# Patient Record
Sex: FEMALE | Race: WHITE | HISPANIC OR LATINO | Employment: FULL TIME | URBAN - METROPOLITAN AREA
[De-identification: names, ages, dates, MRNs, and addresses within clinical notes are randomized per-mention and may not be internally consistent; named-entity substitution may affect disease eponyms.]

---

## 2023-10-20 ENCOUNTER — OFFICE VISIT (OUTPATIENT)
Dept: MEDICAL GROUP | Facility: MEDICAL CENTER | Age: 23
End: 2023-10-20
Payer: COMMERCIAL

## 2023-10-20 ENCOUNTER — HOSPITAL ENCOUNTER (OUTPATIENT)
Facility: MEDICAL CENTER | Age: 23
End: 2023-10-20
Attending: NURSE PRACTITIONER
Payer: COMMERCIAL

## 2023-10-20 VITALS
WEIGHT: 145.06 LBS | SYSTOLIC BLOOD PRESSURE: 116 MMHG | RESPIRATION RATE: 20 BRPM | HEART RATE: 82 BPM | BODY MASS INDEX: 24.17 KG/M2 | DIASTOLIC BLOOD PRESSURE: 78 MMHG | OXYGEN SATURATION: 98 % | HEIGHT: 65 IN | TEMPERATURE: 97 F

## 2023-10-20 DIAGNOSIS — Z00.00 PE (PHYSICAL EXAM), ANNUAL: ICD-10-CM

## 2023-10-20 DIAGNOSIS — Z23 NEED FOR VACCINATION: ICD-10-CM

## 2023-10-20 DIAGNOSIS — N30.90 CYSTITIS: ICD-10-CM

## 2023-10-20 DIAGNOSIS — Z11.59 NEED FOR HEPATITIS C SCREENING TEST: ICD-10-CM

## 2023-10-20 DIAGNOSIS — J45.20 MILD INTERMITTENT ASTHMA WITHOUT COMPLICATION: ICD-10-CM

## 2023-10-20 DIAGNOSIS — Z30.09 BIRTH CONTROL COUNSELING: ICD-10-CM

## 2023-10-20 DIAGNOSIS — R30.0 DYSURIA: ICD-10-CM

## 2023-10-20 DIAGNOSIS — Z11.4 SCREENING FOR HIV (HUMAN IMMUNODEFICIENCY VIRUS): ICD-10-CM

## 2023-10-20 PROBLEM — M72.2 PLANTAR FASCIITIS: Status: ACTIVE | Noted: 2022-04-29

## 2023-10-20 PROBLEM — M77.41 METATARSALGIA OF RIGHT FOOT: Status: ACTIVE | Noted: 2022-04-29

## 2023-10-20 LAB
APPEARANCE UR: CLEAR
BILIRUB UR STRIP-MCNC: NEGATIVE MG/DL
COLOR UR AUTO: YELLOW
GLUCOSE UR STRIP.AUTO-MCNC: NEGATIVE MG/DL
KETONES UR STRIP.AUTO-MCNC: NEGATIVE MG/DL
LEUKOCYTE ESTERASE UR QL STRIP.AUTO: NORMAL
NITRITE UR QL STRIP.AUTO: NEGATIVE
PH UR STRIP.AUTO: 7 [PH] (ref 5–8)
PROT UR QL STRIP: 30 MG/DL
RBC UR QL AUTO: NORMAL
SP GR UR STRIP.AUTO: 1.02
UROBILINOGEN UR STRIP-MCNC: NORMAL MG/DL

## 2023-10-20 PROCEDURE — 90686 IIV4 VACC NO PRSV 0.5 ML IM: CPT | Performed by: NURSE PRACTITIONER

## 2023-10-20 PROCEDURE — 90746 HEPB VACCINE 3 DOSE ADULT IM: CPT | Performed by: NURSE PRACTITIONER

## 2023-10-20 PROCEDURE — 3074F SYST BP LT 130 MM HG: CPT | Performed by: NURSE PRACTITIONER

## 2023-10-20 PROCEDURE — 90715 TDAP VACCINE 7 YRS/> IM: CPT | Performed by: NURSE PRACTITIONER

## 2023-10-20 PROCEDURE — 90472 IMMUNIZATION ADMIN EACH ADD: CPT | Performed by: NURSE PRACTITIONER

## 2023-10-20 PROCEDURE — 3078F DIAST BP <80 MM HG: CPT | Performed by: NURSE PRACTITIONER

## 2023-10-20 PROCEDURE — 81002 URINALYSIS NONAUTO W/O SCOPE: CPT | Performed by: NURSE PRACTITIONER

## 2023-10-20 PROCEDURE — 99204 OFFICE O/P NEW MOD 45 MIN: CPT | Mod: 25 | Performed by: NURSE PRACTITIONER

## 2023-10-20 PROCEDURE — 90471 IMMUNIZATION ADMIN: CPT | Performed by: NURSE PRACTITIONER

## 2023-10-20 RX ORDER — ALBUTEROL SULFATE 90 UG/1
2 AEROSOL, METERED RESPIRATORY (INHALATION) EVERY 6 HOURS PRN
Qty: 8.5 G | Refills: 1 | Status: SHIPPED | OUTPATIENT
Start: 2023-10-20

## 2023-10-20 RX ORDER — NITROFURANTOIN 25; 75 MG/1; MG/1
100 CAPSULE ORAL 2 TIMES DAILY
Qty: 10 CAPSULE | Refills: 0 | Status: SHIPPED | OUTPATIENT
Start: 2023-10-20 | End: 2023-10-25

## 2023-10-20 ASSESSMENT — PATIENT HEALTH QUESTIONNAIRE - PHQ9: CLINICAL INTERPRETATION OF PHQ2 SCORE: 0

## 2023-10-20 NOTE — ASSESSMENT & PLAN NOTE
New to me. Dx's as a child. Has not had any s/s since 15 yo. No chest tightness, cough or wheezing . Not utilizing albuterol.

## 2023-10-20 NOTE — PROGRESS NOTES
Chief Complaint   Patient presents with    Establish Care       HISTORY OF PRESENT ILLNESS: Patient is a 23 y.o. female, new patient who presents today to discuss medical problems as listed below:    Health Maintenance:  COMPLETED       Allergies: Patient has no known allergies.    Current Outpatient Medications   Medication Sig Dispense Refill    albuterol 108 (90 Base) MCG/ACT Aero Soln inhalation aerosol Inhale 2 Puffs every 6 hours as needed for Shortness of Breath. 8.5 g 1    nitrofurantoin (MACROBID) 100 MG Cap Take 1 Capsule by mouth 2 times a day for 5 days. 10 Capsule 0     No current facility-administered medications for this visit.       Allergies, past medical history, past surgical history, family history, social history reviewed and updated.    Review of Systems:     - Constitutional: Negative for fever, chills, unexpected weight change, and fatigue/generalized weakness.     - HEENT: Negative for headaches, vision changes, hearing changes, ear pain, ear discharge, rhinorrhea, sinus congestion, sore throat, and neck pain.      - Respiratory: Negative for cough, sputum production, chest congestion, dyspnea, wheezing, and crackles.      - Cardiovascular: Negative for chest pain, palpitations, orthopnea, and bilateral lower extremity edema.     - Gastrointestinal: Negative for heartburn, nausea, vomiting, abdominal pain, hematochezia, melena, diarrhea, constipation, and greasy/foul-smelling stools.     - Genitourinary: Dysuria, hematuria and urinary frequency.  Negative for , polyuria, pyuria, urinary urgency, and urinary incontinence.    - Musculoskeletal: Negative for myalgias, back pain, and joint pain.     - Skin: Negative for rash, itching, cyanotic skin color change.     - Neurological: Negative for dizziness, tingling, tremors, focal sensory deficit, focal weakness and headaches.     - Endo/Heme/Allergies: Does not bruise/bleed easily.     - Psychiatric/Behavioral: Negative for depression,  "suicidal/homicidal ideation and memory loss.      All other systems reviewed and are negative    Exam:    /78   Pulse 82   Temp 36.1 °C (97 °F) (Temporal)   Resp 20   Ht 1.64 m (5' 4.57\")   Wt 65.8 kg (145 lb 1 oz)   SpO2 98%   BMI 24.46 kg/m²  Body mass index is 24.46 kg/m².    Physical Exam:  Constitutional: Well-developed and well-nourished. Not diaphoretic. No distress.   Skin: Skin is warm and dry. No rash noted.  Head: Atraumatic without lesions.  Eyes: Conjunctivae and extraocular motions are normal. Pupils are equal, round, and reactive to light. No scleral icterus.   Ears:  External ears unremarkable. Tympanic membranes clear and intact.  Nose: Nares patent. Septum midline. Turbinates without erythema nor edema. No discharge.   Mouth/Throat: Dentition is normal. Tongue normal. Oropharynx is clear and moist. Posterior pharynx without erythema or exudates.  Neck: Supple, trachea midline. Normal range of motion. No thyromegaly present. No lymphadenopathy--cervical or supraclavicular.  Cardiovascular: Regular rate and rhythm, S1 and S2 without murmur, rubs, or gallops.    Chest: Effort normal. Clear to auscultation throughout. No adventitious sounds. No CVA tenderness.  Abdomen: Soft, non tender, and without distention. Active bowel sounds in all four quadrants. No rebound, guarding, masses or HSM.  : Dysuria, hematuria and urinary frequency.  Negative for  polyuria, pyuria, urinary urgency, and urinary incontinence.  Extremities: No cyanosis, clubbing, erythema, nor edema. Distal pulses intact and symmetric.   Musculoskeletal: All major joints AROM full in all directions without pain.  Neurological: Alert and oriented x 3. DTRs 2+/3 and symmetric. No cranial nerve deficit. 5/5 myotomes. Sensation intact. Negative Rhomberg.  Psychiatric:  Behavior, mood, and affect are appropriate.  MA/nursing note and vitals reviewed.    Assessment/Plan:  Dysuria  New to me. New problem. A wk ago started having " dysuria, urinary frequency, single episode of blood in urine not related to menses. Denies fever, flank tenderness, low back pain, nausea.   Denies pregnancy, no concerns for STI.  POCT UA with small leuks and large blood.      Mild intermittent asthma without complication  New to me. Dx's as a child. Has not had any s/s since 15 yo. No chest tightness, cough or wheezing . Not utilizing albuterol.     1. Dysuria  Discussed UTI prevention strategies.  Avoid damp or tight clothing, avoid scented detergents, recommend wearing underwear with cotton lining. Increase hydration ( at least 6-8 glasses of water per day). Consume fermented foods (ex. greek yogurt, kefir, fermented veggies.), add OTC vit C and non-sweetened cranberry juice and/or PO cranberry supplement daily.  Also recommend OTC supplement D-Mannose.  Avoid simple carbohydrates, maintain good hygiene post voidal / post BM wiping technique ( front to back). If diabetic, strive to achieve goal A1C.  If sexually active, drink 1 to 2 glasses of water prior to coitus and void right after coitus.    - Chlamydia/GC, PCR (Urine); Future  - POCT Urinalysis  - URINALYSIS,CULTURE IF INDICATED; Future  - nitrofurantoin (MACROBID) 100 MG Cap; Take 1 Capsule by mouth 2 times a day for 5 days.  Dispense: 10 Capsule; Refill: 0    2. Mild intermittent asthma without complication  Stable on current regimen.  Continue.  Refills given, to be used as a rescue inhaler  - albuterol 108 (90 Base) MCG/ACT Aero Soln inhalation aerosol; Inhale 2 Puffs every 6 hours as needed for Shortness of Breath.  Dispense: 8.5 g; Refill: 1    3. Need for vaccination  - Tdap Vaccine =>6YO IM  - INFLUENZA VACCINE QUAD INJ (PF)  - Hepatitis B Vaccine Adult 20+    4. Need for hepatitis C screening test  - HEP C VIRUS ANTIBODY; Future    5. Screening for HIV (human immunodeficiency virus)  - HIV AG/AB COMBO ASSAY SCREENING; Future    6. PE (physical exam), annual  - Comp Metabolic Panel; Future  - CBC  WITHOUT DIFFERENTIAL; Future  - Lipid Profile; Future  - TSH WITH REFLEX TO FT4; Future  - VITAMIN D,25 HYDROXY (DEFICIENCY); Future  - VITAMIN B12; Future    7. Birth control counseling  - Referral to OB/Gyn    8. Cystitis  - nitrofurantoin (MACROBID) 100 MG Cap; Take 1 Capsule by mouth 2 times a day for 5 days.  Dispense: 10 Capsule; Refill: 0      Discussed with patient possible alternative diagnoses, patient is to take all medications as prescribed.      If symptoms persist FU w/PCP, if symptoms worsen go to emergency room.      If experiencing any side effects from prescribed medications report to the office immediately or go to emergency room.     Reviewed indication, dosage, usage and potential adverse effects of prescribed medications.      Reviewed risks and benefits of treatment plan. Patient verbalizes understanding of all instruction and verbally agrees to plan.     Discussed plan with the patient, and patient agrees to the above.      I personally reviewed prior external notes and test results pertinent to today's visit.      No follow-ups on file. 2-3 wks for labs

## 2023-10-20 NOTE — ASSESSMENT & PLAN NOTE
New to me. New problem. A wk ago started having dysuria, urinary frequency, single episode of blood in urine not related to menses. Denies fever, flank tenderness, low back pain, nausea.   Denies pregnancy, no concerns for STI.  POCT UA with small leuks and large blood.

## 2023-10-21 SDOH — ECONOMIC STABILITY: TRANSPORTATION INSECURITY
IN THE PAST 12 MONTHS, HAS THE LACK OF TRANSPORTATION KEPT YOU FROM MEDICAL APPOINTMENTS OR FROM GETTING MEDICATIONS?: NO

## 2023-10-21 SDOH — ECONOMIC STABILITY: HOUSING INSECURITY
IN THE LAST 12 MONTHS, WAS THERE A TIME WHEN YOU DID NOT HAVE A STEADY PLACE TO SLEEP OR SLEPT IN A SHELTER (INCLUDING NOW)?: NO

## 2023-10-21 SDOH — ECONOMIC STABILITY: FOOD INSECURITY: WITHIN THE PAST 12 MONTHS, YOU WORRIED THAT YOUR FOOD WOULD RUN OUT BEFORE YOU GOT MONEY TO BUY MORE.: NEVER TRUE

## 2023-10-21 SDOH — HEALTH STABILITY: PHYSICAL HEALTH: ON AVERAGE, HOW MANY DAYS PER WEEK DO YOU ENGAGE IN MODERATE TO STRENUOUS EXERCISE (LIKE A BRISK WALK)?: 3 DAYS

## 2023-10-21 SDOH — ECONOMIC STABILITY: TRANSPORTATION INSECURITY
IN THE PAST 12 MONTHS, HAS LACK OF RELIABLE TRANSPORTATION KEPT YOU FROM MEDICAL APPOINTMENTS, MEETINGS, WORK OR FROM GETTING THINGS NEEDED FOR DAILY LIVING?: NO

## 2023-10-21 SDOH — ECONOMIC STABILITY: INCOME INSECURITY: HOW HARD IS IT FOR YOU TO PAY FOR THE VERY BASICS LIKE FOOD, HOUSING, MEDICAL CARE, AND HEATING?: NOT HARD AT ALL

## 2023-10-21 SDOH — HEALTH STABILITY: PHYSICAL HEALTH: ON AVERAGE, HOW MANY MINUTES DO YOU ENGAGE IN EXERCISE AT THIS LEVEL?: 50 MIN

## 2023-10-21 SDOH — ECONOMIC STABILITY: FOOD INSECURITY: WITHIN THE PAST 12 MONTHS, THE FOOD YOU BOUGHT JUST DIDN'T LAST AND YOU DIDN'T HAVE MONEY TO GET MORE.: NEVER TRUE

## 2023-10-21 SDOH — ECONOMIC STABILITY: INCOME INSECURITY: IN THE LAST 12 MONTHS, WAS THERE A TIME WHEN YOU WERE NOT ABLE TO PAY THE MORTGAGE OR RENT ON TIME?: NO

## 2023-10-21 SDOH — ECONOMIC STABILITY: TRANSPORTATION INSECURITY
IN THE PAST 12 MONTHS, HAS LACK OF TRANSPORTATION KEPT YOU FROM MEETINGS, WORK, OR FROM GETTING THINGS NEEDED FOR DAILY LIVING?: NO

## 2023-10-21 SDOH — ECONOMIC STABILITY: HOUSING INSECURITY: IN THE LAST 12 MONTHS, HOW MANY PLACES HAVE YOU LIVED?: 2

## 2023-10-21 SDOH — HEALTH STABILITY: MENTAL HEALTH
STRESS IS WHEN SOMEONE FEELS TENSE, NERVOUS, ANXIOUS, OR CAN'T SLEEP AT NIGHT BECAUSE THEIR MIND IS TROUBLED. HOW STRESSED ARE YOU?: ONLY A LITTLE

## 2023-10-21 ASSESSMENT — SOCIAL DETERMINANTS OF HEALTH (SDOH)
IN A TYPICAL WEEK, HOW MANY TIMES DO YOU TALK ON THE PHONE WITH FAMILY, FRIENDS, OR NEIGHBORS?: ONCE A WEEK
HOW OFTEN DO YOU GET TOGETHER WITH FRIENDS OR RELATIVES?: ONCE A WEEK
ARE YOU MARRIED, WIDOWED, DIVORCED, SEPARATED, NEVER MARRIED, OR LIVING WITH A PARTNER?: NEVER MARRIED
DO YOU BELONG TO ANY CLUBS OR ORGANIZATIONS SUCH AS CHURCH GROUPS UNIONS, FRATERNAL OR ATHLETIC GROUPS, OR SCHOOL GROUPS?: NO
HOW OFTEN DO YOU ATTEND CHURCH OR RELIGIOUS SERVICES?: 1 TO 4 TIMES PER YEAR
IN A TYPICAL WEEK, HOW MANY TIMES DO YOU TALK ON THE PHONE WITH FAMILY, FRIENDS, OR NEIGHBORS?: ONCE A WEEK
ARE YOU MARRIED, WIDOWED, DIVORCED, SEPARATED, NEVER MARRIED, OR LIVING WITH A PARTNER?: NEVER MARRIED
HOW OFTEN DO YOU ATTENT MEETINGS OF THE CLUB OR ORGANIZATION YOU BELONG TO?: PATIENT DECLINED
HOW OFTEN DO YOU ATTEND CHURCH OR RELIGIOUS SERVICES?: 1 TO 4 TIMES PER YEAR
HOW OFTEN DO YOU HAVE A DRINK CONTAINING ALCOHOL: 2-3 TIMES A WEEK
HOW MANY DRINKS CONTAINING ALCOHOL DO YOU HAVE ON A TYPICAL DAY WHEN YOU ARE DRINKING: 3 OR 4
HOW OFTEN DO YOU GET TOGETHER WITH FRIENDS OR RELATIVES?: ONCE A WEEK
HOW HARD IS IT FOR YOU TO PAY FOR THE VERY BASICS LIKE FOOD, HOUSING, MEDICAL CARE, AND HEATING?: NOT HARD AT ALL
HOW OFTEN DO YOU ATTENT MEETINGS OF THE CLUB OR ORGANIZATION YOU BELONG TO?: PATIENT DECLINED
WITHIN THE PAST 12 MONTHS, YOU WORRIED THAT YOUR FOOD WOULD RUN OUT BEFORE YOU GOT THE MONEY TO BUY MORE: NEVER TRUE
HOW OFTEN DO YOU HAVE SIX OR MORE DRINKS ON ONE OCCASION: LESS THAN MONTHLY
DO YOU BELONG TO ANY CLUBS OR ORGANIZATIONS SUCH AS CHURCH GROUPS UNIONS, FRATERNAL OR ATHLETIC GROUPS, OR SCHOOL GROUPS?: NO

## 2023-10-21 ASSESSMENT — LIFESTYLE VARIABLES
HOW OFTEN DO YOU HAVE SIX OR MORE DRINKS ON ONE OCCASION: LESS THAN MONTHLY
SKIP TO QUESTIONS 9-10: 0
HOW OFTEN DO YOU HAVE A DRINK CONTAINING ALCOHOL: 2-3 TIMES A WEEK
AUDIT-C TOTAL SCORE: 5
HOW MANY STANDARD DRINKS CONTAINING ALCOHOL DO YOU HAVE ON A TYPICAL DAY: 3 OR 4

## 2023-10-23 ENCOUNTER — TELEPHONE (OUTPATIENT)
Dept: MEDICAL GROUP | Facility: MEDICAL CENTER | Age: 23
End: 2023-10-23
Payer: COMMERCIAL

## 2023-10-23 NOTE — TELEPHONE ENCOUNTER
[facility] lab contacted office requesting recollect of URINALYSIS,CULTURE IF INDICATED lab sent gray top for specimen collect and requires the whole cup to be sent in for order

## 2023-10-24 ENCOUNTER — HOSPITAL ENCOUNTER (OUTPATIENT)
Facility: MEDICAL CENTER | Age: 23
End: 2023-10-24
Attending: NURSE PRACTITIONER
Payer: COMMERCIAL

## 2023-10-24 ENCOUNTER — OFFICE VISIT (OUTPATIENT)
Dept: MEDICAL GROUP | Facility: MEDICAL CENTER | Age: 23
End: 2023-10-24
Payer: COMMERCIAL

## 2023-10-24 VITALS
BODY MASS INDEX: 24.86 KG/M2 | HEART RATE: 85 BPM | TEMPERATURE: 97.6 F | WEIGHT: 145.6 LBS | OXYGEN SATURATION: 99 % | SYSTOLIC BLOOD PRESSURE: 100 MMHG | RESPIRATION RATE: 16 BRPM | HEIGHT: 64 IN | DIASTOLIC BLOOD PRESSURE: 60 MMHG

## 2023-10-24 DIAGNOSIS — Z11.3 ROUTINE SCREENING FOR STI (SEXUALLY TRANSMITTED INFECTION): ICD-10-CM

## 2023-10-24 DIAGNOSIS — Z01.419 WELL FEMALE EXAM WITH ROUTINE GYNECOLOGICAL EXAM: ICD-10-CM

## 2023-10-24 DIAGNOSIS — Z11.51 SCREENING FOR HPV (HUMAN PAPILLOMAVIRUS): ICD-10-CM

## 2023-10-24 PROCEDURE — 87591 N.GONORRHOEAE DNA AMP PROB: CPT

## 2023-10-24 PROCEDURE — 87491 CHLMYD TRACH DNA AMP PROBE: CPT

## 2023-10-24 PROCEDURE — 3078F DIAST BP <80 MM HG: CPT | Performed by: NURSE PRACTITIONER

## 2023-10-24 PROCEDURE — 3074F SYST BP LT 130 MM HG: CPT | Performed by: NURSE PRACTITIONER

## 2023-10-24 PROCEDURE — 88175 CYTOPATH C/V AUTO FLUID REDO: CPT

## 2023-10-24 PROCEDURE — 99395 PREV VISIT EST AGE 18-39: CPT | Performed by: NURSE PRACTITIONER

## 2023-10-24 NOTE — PROGRESS NOTES
Established Patient    CC: Diana Manzanares is a 23 y.o.,female who presents today for Pap and pelvic exam. No complaints today.    HPI:     Her LMP was 2 wks ago.    She is having regular menses.   Her menstrual history is unremarkable.   Her form of contraception is  none  Hx of abnormal pelvic exams: No .   Last Pap 1st one.  Hx of abnormal Pap no  OB History    Para Term  AB Living   1 0 0 0 0 0   SAB IAB Ectopic Molar Multiple Live Births   0 0 0 0 0 0      Social History     Substance and Sexual Activity   Sexual Activity Yes    Partners: Male     Sexual history: single partner   She  reports that she has never smoked. She has never used smokeless tobacco.  Patient denies any current symptoms: No dyspareunia, no itching, no discharge, and no lesions.  Patient denies fam Hx of GYN or breast cancers.   She is , P:0.   Previous pregnancies, without significant complications.   Patient denies any STD risks or concerns.   Patient denies any history of sexual abuse.   Patient denies breast masses or lesions .    No problem-specific Assessment & Plan notes found for this encounter.          Allergies: Patient has no known allergies.    Current Outpatient Medications   Medication Sig Dispense Refill    albuterol 108 (90 Base) MCG/ACT Aero Soln inhalation aerosol Inhale 2 Puffs every 6 hours as needed for Shortness of Breath. 8.5 g 1    nitrofurantoin (MACROBID) 100 MG Cap Take 1 Capsule by mouth 2 times a day for 5 days. 10 Capsule 0     No current facility-administered medications for this visit.     Patient Active Problem List    Diagnosis Date Noted    Dysuria 10/20/2023    Mild intermittent asthma without complication 10/20/2023    Metatarsalgia of right foot 2022    Plantar fasciitis 2022     Current Outpatient Medications on File Prior to Visit   Medication Sig Dispense Refill    albuterol 108 (90 Base) MCG/ACT Aero Soln inhalation aerosol Inhale 2 Puffs every 6 hours as needed for  "Shortness of Breath. 8.5 g 1    nitrofurantoin (MACROBID) 100 MG Cap Take 1 Capsule by mouth 2 times a day for 5 days. 10 Capsule 0     No current facility-administered medications on file prior to visit.     No family history on file.  Social History     Tobacco Use    Smoking status: Never    Smokeless tobacco: Never   Substance Use Topics    Alcohol use: Not on file       Allergies, past medical history, past surgical history, medications, family history, social history reviewed and updated.    Exercise: moderate regular exercise program  Her preventative health screens are up to date.    ROS:  Constitutional: Denies fevers or chills  Eyes: Denies changes in vision  Ears/Nose/Throat/Mouth: Denies nasal congestion or sore throat   Cardiovascular: Denies chest pain or palpitations   Respiratory: Denies shortness of breath, Denies cough  Gastrointestinal/Hepatic: Denies abdominal pain, nausea, vomiting   Genitourinary: Denies dysuria or frequency  Musculoskeletal/Rheum: Denies joint pain and swelling   Neurological: Denies headache or visual changes  Psychiatric: Denies mood disorder   Endocrine: Denies hx of diabetes or thyroid dysfunction  Heme/Oncology/Lymph Nodes: Denies weight changes or enlarged LNs.    GYN ROS:    Normal menses.  Cramping is mild.   She does not take OTC analgesics for cramping  No significant bloating/fluid retention, no pelvic pain, or dyspareunia. No abnormal bleeding or vaginal discharge.   No breast pain or tenderness, no new or enlarging lumps on elf-exam, nipple discharge, changes in size or contour, or abnormal cyclic discomfort.  No urinary tract symptoms, no incontinence, no polydipsia, polyuria,  No abdominal pain, change in bowel habits, black or bloody stools.    No menstrual migraines   No depression, labile mood, anxiety, libido changes, insomnia.  No temperature intolerance.    /60   Pulse 85   Temp 36.4 °C (97.6 °F) (Temporal)   Resp 16   Ht 1.626 m (5' 4\")   Wt 66 " kg (145 lb 9.6 oz)   SpO2 99%   BMI 24.99 kg/m²   Body mass index is 24.99 kg/m².  Vitals Noted and Reviewed    Physical Exam  General: Normal appearance.  Well developed, well nourished, no acute distress.  HEENT: Pupils are equal. Conjunctiva is normal. Head is normal appearing.   Pulmonary: Clear to auscultation, with good breath sounds.  Cardiovascular: regular rate and rhythm. No murmur auscultated. No carotid bruits.  Lungs: normal to percussion and auscultation  Cardiac: normal  Abdomen: Soft, nontender, nondistended. Normal bowel sounds. Liver and spleen are not enlarged.  Neurologic: no focal deficits. Strength and sensation are normal.  Skin: No obvious lesions.  Lymph: No cervical, supraclavicular, axillary, inguinal nodes noted.    Pelvic exam:   External genitalia are normal in appearance, no lesions.   Vulva: grossly unremarkable  Vagina: no abnormal discharge  Speculum exam:  Vaginal Mucosa: normal vaginal mucosa  Cervix: parous, normal cervix and mucosa, no lesions, non-friable. No cervical motion tenderness.  Pap performed and sample collected and sent to lab.  Uterus: Normal shape, position and consistency, normal in size, no masses.  Bimanual exam: No uteromegaly, negative chandelier sign, adnexa freely movable and without enlargements bilaterally.  Rectal: not performed  Breast exam: Breasts: self breast exams    A chaperone was offered to the patient during today's exam. Chaperone name: Leonardo was present.    Assessment and Plan  NormalGYN Exam  pap smear  return annually or prn  Pap was processed and sent to the lab.    There are no diagnoses linked to this encounter.    Plan:   pap smear  return annually or prn  Discussed  breast self exam, STD prevention, feminine hygiene     Discussed  breast self exam, diet and exercise, different methods of contraception   Follow-up in one year for annual physical.    This note was created using voice recognition software. There may be unintended errors in  spelling, grammar or content.

## 2023-10-26 LAB
C TRACH RRNA CVX QL NAA+PROBE: NEGATIVE
COMMENT NL11729A: NORMAL
CYTOLOGIST CVX/VAG CYTO: NORMAL
CYTOLOGY CVX/VAG DOC CYTO: NORMAL
CYTOLOGY CVX/VAG DOC THIN PREP: NORMAL
N GONORRHOEA RRNA CVX QL NAA+PROBE: NEGATIVE
NOTE NL11727A: NORMAL
OTHER STN SPEC: NORMAL
STAT OF ADQ CVX/VAG CYTO-IMP: NORMAL